# Patient Record
Sex: FEMALE | Race: WHITE
[De-identification: names, ages, dates, MRNs, and addresses within clinical notes are randomized per-mention and may not be internally consistent; named-entity substitution may affect disease eponyms.]

---

## 2020-10-01 ENCOUNTER — OFFICE VISIT (OUTPATIENT)
Age: 55
End: 2020-10-01

## 2021-04-08 ENCOUNTER — TELEPHONE ENCOUNTER (OUTPATIENT)
Dept: URBAN - METROPOLITAN AREA CLINIC 9 | Facility: CLINIC | Age: 56
End: 2021-04-08

## 2021-04-08 ENCOUNTER — OFFICE VISIT (OUTPATIENT)
Dept: URBAN - METROPOLITAN AREA CLINIC 7 | Facility: CLINIC | Age: 56
End: 2021-04-08

## 2021-04-15 ENCOUNTER — OFFICE VISIT (OUTPATIENT)
Dept: URBAN - METROPOLITAN AREA CLINIC 7 | Facility: CLINIC | Age: 56
End: 2021-04-15

## 2021-04-22 ENCOUNTER — OFFICE VISIT (OUTPATIENT)
Age: 56
End: 2021-04-22

## 2021-04-26 ENCOUNTER — OFFICE VISIT (OUTPATIENT)
Dept: URBAN - METROPOLITAN AREA SURGERY CENTER 5 | Facility: SURGERY CENTER | Age: 56
End: 2021-04-26

## 2021-05-14 ENCOUNTER — TELEPHONE ENCOUNTER (OUTPATIENT)
Dept: URBAN - METROPOLITAN AREA CLINIC 9 | Facility: CLINIC | Age: 56
End: 2021-05-14

## 2021-05-14 ENCOUNTER — OFFICE VISIT (OUTPATIENT)
Dept: URBAN - METROPOLITAN AREA SURGERY CENTER 5 | Facility: SURGERY CENTER | Age: 56
End: 2021-05-14

## 2021-05-27 ENCOUNTER — OFFICE VISIT (OUTPATIENT)
Dept: URBAN - METROPOLITAN AREA SURGERY CENTER 5 | Facility: SURGERY CENTER | Age: 56
End: 2021-05-27

## 2021-06-04 ENCOUNTER — OFFICE VISIT (OUTPATIENT)
Dept: URBAN - METROPOLITAN AREA SURGERY CENTER 5 | Facility: SURGERY CENTER | Age: 56
End: 2021-06-04

## 2021-06-07 ENCOUNTER — LAB OUTSIDE AN ENCOUNTER (OUTPATIENT)
Age: 56
End: 2021-06-07

## 2021-06-18 ENCOUNTER — LAB OUTSIDE AN ENCOUNTER (OUTPATIENT)
Age: 56
End: 2021-06-18

## 2021-06-18 LAB — FECAL OCCULT HGB ASSAY, QUAL, 1-3 SIMULTANEOU: POSITIVE

## 2021-06-21 ENCOUNTER — TELEPHONE ENCOUNTER (OUTPATIENT)
Dept: URBAN - METROPOLITAN AREA CLINIC 9 | Facility: CLINIC | Age: 56
End: 2021-06-21

## 2021-07-01 LAB
ABSOLUTE BASOPHILS: (no result)
ABSOLUTE EOSINOPHILS: (no result)
ABSOLUTE LYMPHOCYTES: (no result)
ABSOLUTE MONOCYTES: (no result)
ABSOLUTE NEUTROPHILS: (no result)
BASOPHILS: (no result)
EOSINOPHILS: (no result)
HEMATOCRIT: (no result)
HEMOGLOBIN: (no result)
LYMPHOCYTES: (no result)
MCH: (no result)
MCHC: (no result)
MCV: (no result)
MONOCYTES: (no result)
MPV: (no result)
NEUTROPHILS: (no result)
PLATELET COUNT: 351
RDW: (no result)
RED BLOOD CELL COUNT: (no result)
WHITE BLOOD CELL COUNT: 7.7

## 2021-07-09 ENCOUNTER — TELEPHONE ENCOUNTER (OUTPATIENT)
Dept: URBAN - METROPOLITAN AREA CLINIC 9 | Facility: CLINIC | Age: 56
End: 2021-07-09

## 2021-07-09 ENCOUNTER — LAB OUTSIDE AN ENCOUNTER (OUTPATIENT)
Age: 56
End: 2021-07-09

## 2021-07-22 LAB — Lab: (no result)

## 2021-07-26 LAB
ALPHA FETOPROTEIN, TUMOR MARKER: (no result)
ANA SCREEN, IFA: NEGATIVE
CERULOPLASMIN: (no result)
HEPATITIS A IGM: (no result)
HEPATITIS B CORE ANTIBODY (IGM): (no result)
HEPATITIS B SURFACE ANTIGEN (REFL): (no result)
HEPATITIS C ANTIBODY: (no result)
INDEX: 0.01
MITOCHONDRIAL AB SCREEN: NEGATIVE

## 2021-07-28 ENCOUNTER — TELEPHONE ENCOUNTER (OUTPATIENT)
Dept: URBAN - METROPOLITAN AREA CLINIC 9 | Facility: CLINIC | Age: 56
End: 2021-07-28

## 2021-07-28 ENCOUNTER — OFFICE VISIT (OUTPATIENT)
Dept: URBAN - METROPOLITAN AREA CLINIC 7 | Facility: CLINIC | Age: 56
End: 2021-07-28

## 2021-08-06 ENCOUNTER — TELEPHONE ENCOUNTER (OUTPATIENT)
Dept: URBAN - METROPOLITAN AREA CLINIC 9 | Facility: CLINIC | Age: 56
End: 2021-08-06

## 2021-08-13 ENCOUNTER — OFFICE VISIT (OUTPATIENT)
Dept: URBAN - METROPOLITAN AREA CLINIC 7 | Facility: CLINIC | Age: 56
End: 2021-08-13

## 2021-08-18 ENCOUNTER — OFFICE VISIT (OUTPATIENT)
Dept: URBAN - METROPOLITAN AREA CLINIC 7 | Facility: CLINIC | Age: 56
End: 2021-08-18

## 2021-08-19 ENCOUNTER — TELEPHONE ENCOUNTER (OUTPATIENT)
Dept: URBAN - METROPOLITAN AREA CLINIC 9 | Facility: CLINIC | Age: 56
End: 2021-08-19

## 2021-08-20 ENCOUNTER — OFFICE VISIT (OUTPATIENT)
Dept: URBAN - METROPOLITAN AREA CLINIC 7 | Facility: CLINIC | Age: 56
End: 2021-08-20

## 2021-08-31 ENCOUNTER — LAB OUTSIDE AN ENCOUNTER (OUTPATIENT)
Age: 56
End: 2021-08-31

## 2021-09-01 LAB
ABSOLUTE BASOPHILS: (no result)
ABSOLUTE EOSINOPHILS: (no result)
ABSOLUTE LYMPHOCYTES: (no result)
ABSOLUTE MONOCYTES: (no result)
ABSOLUTE NEUTROPHILS: (no result)
BASOPHILS: (no result)
EOSINOPHILS: (no result)
HEMATOCRIT: (no result)
HEMOGLOBIN: (no result)
LYMPHOCYTES: (no result)
MCH: (no result)
MCHC: (no result)
MCV: (no result)
MONOCYTES: (no result)
MPV: (no result)
NEUTROPHILS: (no result)
PLATELET COUNT: 327
RDW: (no result)
RED BLOOD CELL COUNT: (no result)
WHITE BLOOD CELL COUNT: 6.9

## 2022-07-30 ENCOUNTER — TELEPHONE ENCOUNTER (OUTPATIENT)
Age: 57
End: 2022-07-30

## 2022-07-30 RX ORDER — BACITRACIN 500 UNIT/G
2 (TWO) OINTMENT (GRAM) TOPICAL DAILY
Qty: 0 | Refills: 2 | OUTPATIENT
Start: 2021-07-28 | End: 2021-08-27

## 2022-07-31 ENCOUNTER — TELEPHONE ENCOUNTER (OUTPATIENT)
Age: 57
End: 2022-07-31

## 2022-07-31 RX ORDER — BACITRACIN 500 UNIT/G
2 (TWO) OINTMENT (GRAM) TOPICAL DAILY
Qty: 0 | Refills: 2 | Status: ACTIVE | COMMUNITY
Start: 2021-07-28

## 2022-08-31 ENCOUNTER — WEB ENCOUNTER (OUTPATIENT)
Dept: URBAN - METROPOLITAN AREA CLINIC 7 | Facility: CLINIC | Age: 57
End: 2022-08-31

## 2022-08-31 ENCOUNTER — LAB OUTSIDE AN ENCOUNTER (OUTPATIENT)
Dept: URBAN - METROPOLITAN AREA CLINIC 7 | Facility: CLINIC | Age: 57
End: 2022-08-31

## 2022-08-31 ENCOUNTER — OFFICE VISIT (OUTPATIENT)
Dept: URBAN - METROPOLITAN AREA CLINIC 7 | Facility: CLINIC | Age: 57
End: 2022-08-31
Payer: COMMERCIAL

## 2022-08-31 VITALS
DIASTOLIC BLOOD PRESSURE: 81 MMHG | BODY MASS INDEX: 38.8 KG/M2 | WEIGHT: 262 LBS | TEMPERATURE: 98 F | SYSTOLIC BLOOD PRESSURE: 123 MMHG | HEIGHT: 69 IN

## 2022-08-31 DIAGNOSIS — K76.0 NAFLD (NONALCOHOLIC FATTY LIVER DISEASE): ICD-10-CM

## 2022-08-31 DIAGNOSIS — D50.9 IRON DEFICIENCY ANEMIA: ICD-10-CM

## 2022-08-31 DIAGNOSIS — E66.9 OBESITY WITH BODY MASS INDEX 30 OR GREATER: ICD-10-CM

## 2022-08-31 DIAGNOSIS — R93.5 ABNORMAL CT OF THE ABDOMEN: ICD-10-CM

## 2022-08-31 DIAGNOSIS — D49.0 IPMN (INTRADUCTAL PAPILLARY MUCINOUS NEOPLASM): ICD-10-CM

## 2022-08-31 PROCEDURE — 99214 OFFICE O/P EST MOD 30 MIN: CPT | Performed by: INTERNAL MEDICINE

## 2022-08-31 RX ORDER — ESZOPICLONE 2 MG/1
1 TABLET IMMEDIATELY BEFORE BEDTIME TABLET, COATED ORAL
Status: ACTIVE | COMMUNITY

## 2022-08-31 RX ORDER — AMLODIPINE BESYLATE 5 MG/1
1 TABLET TABLET ORAL ONCE A DAY
Status: ACTIVE | COMMUNITY

## 2022-08-31 RX ORDER — BACITRACIN 500 UNIT/G
2 (TWO) OINTMENT (GRAM) TOPICAL DAILY
Qty: 0 | Refills: 2 | Status: ACTIVE | COMMUNITY
Start: 2021-07-28

## 2022-08-31 RX ORDER — RIVAROXABAN 20 MG/1
1 TABLET WITH FOOD TABLET, FILM COATED ORAL ONCE A DAY
Status: ACTIVE | COMMUNITY

## 2022-08-31 RX ORDER — OMEPRAZOLE 20 MG/1
1 CAPSULE 30 MINUTES BEFORE MORNING MEAL CAPSULE, DELAYED RELEASE ORAL ONCE A DAY
Status: ACTIVE | COMMUNITY

## 2022-08-31 RX ORDER — LOSARTAN POTASSIUM 100 MG/1
1 TABLET TABLET ORAL ONCE A DAY
Status: ACTIVE | COMMUNITY

## 2022-08-31 NOTE — HPI-TODAY'S VISIT:
56yo F with hx of NAFLD, mulitple liver hemangiomas, side branch IPMN seen on MRI 8/2021.  prior hx of gastric bypass, hx of prior live enzyme elevaltions.  Presents for follow up of panc cyst was told to repeat MRI.  She has not been able to lose weight as previously recommended she did have liver enzyme testing done 2 weeks ago by pcp was told enzymes normal. denies abdominal pain, abdominal distention, fever, leg swelling, vomiting of blood, black stool, rectal bleeding, confusion There has been no associated dysphagia, GERD, anorexia, weight loss, anemia, occult blood in stool, rectal bleeding, melena, nausea, vomiting, abdominal pain, postprandial fullness, early satiety, abdominal distention, bloating, change in bowel habits, change in stool caliber, diarrhea, constipation, anticoagulant/antiplatelet or NSAID's use.     No FMH of GI cancers including CRC.

## 2023-05-04 ENCOUNTER — OFFICE VISIT (OUTPATIENT)
Dept: URBAN - METROPOLITAN AREA CLINIC 7 | Facility: CLINIC | Age: 58
End: 2023-05-04
Payer: COMMERCIAL

## 2023-05-04 ENCOUNTER — LAB OUTSIDE AN ENCOUNTER (OUTPATIENT)
Dept: URBAN - METROPOLITAN AREA CLINIC 7 | Facility: CLINIC | Age: 58
End: 2023-05-04

## 2023-05-04 ENCOUNTER — DASHBOARD ENCOUNTERS (OUTPATIENT)
Age: 58
End: 2023-05-04

## 2023-05-04 VITALS
TEMPERATURE: 97.7 F | SYSTOLIC BLOOD PRESSURE: 132 MMHG | RESPIRATION RATE: 16 BRPM | DIASTOLIC BLOOD PRESSURE: 82 MMHG | WEIGHT: 260 LBS | HEIGHT: 69 IN | BODY MASS INDEX: 38.51 KG/M2

## 2023-05-04 DIAGNOSIS — D49.0 IPMN (INTRADUCTAL PAPILLARY MUCINOUS NEOPLASM): ICD-10-CM

## 2023-05-04 DIAGNOSIS — D18.03 LIVER HEMANGIOMA: ICD-10-CM

## 2023-05-04 DIAGNOSIS — K76.0 NAFLD (NONALCOHOLIC FATTY LIVER DISEASE): ICD-10-CM

## 2023-05-04 DIAGNOSIS — D50.9 IRON DEFICIENCY ANEMIA: ICD-10-CM

## 2023-05-04 DIAGNOSIS — R93.5 ABNORMAL CT OF THE ABDOMEN: ICD-10-CM

## 2023-05-04 PROBLEM — 93469006: Status: ACTIVE | Noted: 2023-05-04

## 2023-05-04 PROCEDURE — 99214 OFFICE O/P EST MOD 30 MIN: CPT | Performed by: INTERNAL MEDICINE

## 2023-05-04 RX ORDER — OMEPRAZOLE 20 MG/1
1 CAPSULE 30 MINUTES BEFORE MORNING MEAL CAPSULE, DELAYED RELEASE ORAL ONCE A DAY
Status: ACTIVE | COMMUNITY

## 2023-05-04 RX ORDER — AMLODIPINE BESYLATE 5 MG/1
1 TABLET TABLET ORAL ONCE A DAY
Status: ACTIVE | COMMUNITY

## 2023-05-04 RX ORDER — RIVAROXABAN 20 MG/1
1 TABLET WITH FOOD TABLET, FILM COATED ORAL ONCE A DAY
Status: ACTIVE | COMMUNITY

## 2023-05-04 RX ORDER — LOSARTAN POTASSIUM 100 MG/1
1 TABLET TABLET ORAL ONCE A DAY
Status: ACTIVE | COMMUNITY

## 2023-05-04 RX ORDER — ESZOPICLONE 2 MG/1
1 TABLET IMMEDIATELY BEFORE BEDTIME TABLET, COATED ORAL
Status: ACTIVE | COMMUNITY

## 2023-05-04 RX ORDER — BACITRACIN 500 UNIT/G
2 (TWO) OINTMENT (GRAM) TOPICAL DAILY
Qty: 0 | Refills: 2 | Status: ACTIVE | COMMUNITY
Start: 2021-07-28

## 2023-05-04 NOTE — HPI-TODAY'S VISIT:
58yo F with hx of NAFLD, mulitple liver hemangiomas, side branch IPMN seen on MRI 8/2021.  prior hx of gastric bypass, hx of prior live enzyme elevaltions.  Presents for follow up of panc cyst was told to repeat MRI.  She has not been able to lose weight as previously recommended she did have liver enzyme testing done 2 weeks ago by pcp was told enzymes normal. denies abdominal pain, abdominal distention, fever, leg swelling, vomiting of blood, black stool, rectal bleeding, confusion There has been no associated dysphagia, GERD, anorexia, weight loss, anemia, occult blood in stool, rectal bleeding, melena, nausea, vomiting, abdominal pain, postprandial fullness, early satiety, abdominal distention, bloating, change in bowel habits, change in stool caliber, diarrhea, constipation, anticoagulant/antiplatelet or NSAID's use.     No FMH of GI cancers including CRC.  Interval hx 5/4/23 here today for follow up after having ER visit for SOB and elevated d dimer concerning for dvt/pe however CTA was negative, it did incidentally show R liver mass.  However the patient has had known liver hemangiomas and IPMN which have been followed over a few years, her last triphasic MRI mass protocol of the liver was done 11/2023 which confirms a large hemangioma of R lobe of liver with smaller L sided hemangiomas (the CT and MRI measurements to vary however)  there was no arterial enhancement or wash out these features were considered to be c/w typical large hemangioma.  her fibroscan was S3 F0 from 11/2023 as well.  on 4/13/23 transaminases mildly elevated from ER labs.   plan continued abstinence from etoh, weigh tloss, repeat labs, will add AFP to th repeat labs.  She is up to date on CRC screening done by Dr. Jorgensen.  Would repeat MRI triphasic earlier than the 2 year plan repeat in 6 months to ensure stability.  if size is truly growing at this rate would refer to surgery even given its benign nature just based on growth rate and size

## 2023-07-01 LAB
AFP, SERUM: 5.1
AFP-L3%, SERUM: (no result)

## 2024-06-07 ENCOUNTER — TELEPHONE ENCOUNTER (OUTPATIENT)
Dept: URBAN - METROPOLITAN AREA CLINIC 7 | Facility: CLINIC | Age: 59
End: 2024-06-07

## 2024-06-11 ENCOUNTER — TELEPHONE ENCOUNTER (OUTPATIENT)
Dept: URBAN - METROPOLITAN AREA CLINIC 7 | Facility: CLINIC | Age: 59
End: 2024-06-11

## 2024-06-12 ENCOUNTER — LAB OUTSIDE AN ENCOUNTER (OUTPATIENT)
Dept: URBAN - METROPOLITAN AREA CLINIC 7 | Facility: CLINIC | Age: 59
End: 2024-06-12

## 2024-07-31 ENCOUNTER — TELEPHONE ENCOUNTER (OUTPATIENT)
Dept: URBAN - METROPOLITAN AREA CLINIC 9 | Facility: CLINIC | Age: 59
End: 2024-07-31

## 2024-08-02 ENCOUNTER — TELEPHONE ENCOUNTER (OUTPATIENT)
Dept: URBAN - METROPOLITAN AREA CLINIC 7 | Facility: CLINIC | Age: 59
End: 2024-08-02

## 2024-10-18 ENCOUNTER — LAB OUTSIDE AN ENCOUNTER (OUTPATIENT)
Dept: URBAN - METROPOLITAN AREA CLINIC 7 | Facility: CLINIC | Age: 59
End: 2024-10-18

## 2025-04-07 ENCOUNTER — OFFICE VISIT (OUTPATIENT)
Dept: URBAN - METROPOLITAN AREA CLINIC 9 | Facility: CLINIC | Age: 60
End: 2025-04-07
Payer: COMMERCIAL

## 2025-04-07 DIAGNOSIS — K76.0 NAFLD (NONALCOHOLIC FATTY LIVER DISEASE): ICD-10-CM

## 2025-04-07 DIAGNOSIS — D49.0 IPMN (INTRADUCTAL PAPILLARY MUCINOUS NEOPLASM): ICD-10-CM

## 2025-04-07 PROCEDURE — 99214 OFFICE O/P EST MOD 30 MIN: CPT | Performed by: INTERNAL MEDICINE

## 2025-04-07 RX ORDER — TIRZEPATIDE 2.5 MG/.5ML
INJECTION, SOLUTION SUBCUTANEOUS
Qty: 2 MILLILITER | Status: ACTIVE | COMMUNITY

## 2025-04-07 RX ORDER — LOSARTAN POTASSIUM 100 MG/1
1 TABLET TABLET ORAL ONCE A DAY
Status: ACTIVE | COMMUNITY

## 2025-04-07 RX ORDER — PHENTERMINE HYDROCHLORIDE 15 MG/1
TAKE 1 CAPSULE BY MOUTH EVERY DAY FOR 90 DAYS CAPSULE ORAL
Qty: 90 EACH | Refills: 1 | Status: ACTIVE | COMMUNITY

## 2025-04-07 RX ORDER — NALTREXONE HYDROCHLORIDE 50 MG/1
TAKE 1 TABLET BY MOUTH EVERY DAY TABLET, FILM COATED ORAL
Qty: 90 EACH | Refills: 1 | Status: ACTIVE | COMMUNITY

## 2025-04-07 RX ORDER — AMLODIPINE BESYLATE 5 MG/1
1 TABLET TABLET ORAL ONCE A DAY
Status: ACTIVE | COMMUNITY

## 2025-04-07 RX ORDER — BACITRACIN 500 UNIT/G
2 (TWO) OINTMENT (GRAM) TOPICAL DAILY
Qty: 0 | Refills: 2 | Status: DISCONTINUED | COMMUNITY
Start: 2021-07-28

## 2025-04-07 RX ORDER — RIVAROXABAN 20 MG/1
1 TABLET WITH FOOD TABLET, FILM COATED ORAL ONCE A DAY
Status: ACTIVE | COMMUNITY

## 2025-04-07 RX ORDER — ESZOPICLONE 2 MG/1
1 TABLET IMMEDIATELY BEFORE BEDTIME TABLET, COATED ORAL
Status: DISCONTINUED | COMMUNITY

## 2025-04-07 RX ORDER — OMEPRAZOLE 20 MG/1
1 CAPSULE 30 MINUTES BEFORE MORNING MEAL CAPSULE, DELAYED RELEASE ORAL ONCE A DAY
Status: DISCONTINUED | COMMUNITY

## 2025-04-07 NOTE — HPI-TODAY'S VISIT:
58yo F with hx of NAFLD, mulitple liver hemangiomas, side branch IPMN seen on MRI 8/2021.  prior hx of gastric bypass, hx of prior live enzyme elevaltions.  Presents for follow up of panc cyst was told to repeat MRI.  She has not been able to lose weight as previously recommended she did have liver enzyme testing done 2 weeks ago by pcp was told enzymes normal. denies abdominal pain, abdominal distention, fever, leg swelling, vomiting of blood, black stool, rectal bleeding, confusion There has been no associated dysphagia, GERD, anorexia, weight loss, anemia, occult blood in stool, rectal bleeding, melena, nausea, vomiting, abdominal pain, postprandial fullness, early satiety, abdominal distention, bloating, change in bowel habits, change in stool caliber, diarrhea, constipation, anticoagulant/antiplatelet or NSAID's use.     No FMH of GI cancers including CRC.  Interval hx 5/4/23 here today for follow up after having ER visit for SOB and elevated d dimer concerning for dvt/pe however CTA was negative, it did incidentally show R liver mass.  However the patient has had known liver hemangiomas and IPMN which have been followed over a few years, her last triphasic MRI mass protocol of the liver was done 11/2023 which confirms a large hemangioma of R lobe of liver with smaller L sided hemangiomas (the CT and MRI measurements to vary however)  there was no arterial enhancement or wash out these features were considered to be c/w typical large hemangioma.  her fibroscan was S3 F0 from 11/2023 as well.  on 4/13/23 transaminases mildly elevated from ER labs.   plan continued abstinence from etoh, weigh tloss, repeat labs, will add AFP to th repeat labs.  She is up to date on CRC screening done by Dr. Jorgensen.  Would repeat MRI triphasic earlier than the 2 year plan repeat in 6 months to ensure stability.  if size is truly growing at this rate would refer to surgery even given its benign nature just based on growth rate and size  IH 4/7/2025 59 year old female presents today for liver follow up  labs completed 4/2/2025 AST/ALT 37/45 ALP: 66 Bili 0.3  she had an MRI last year

## 2025-04-08 ENCOUNTER — OFFICE VISIT (OUTPATIENT)
Dept: URBAN - METROPOLITAN AREA CLINIC 7 | Facility: CLINIC | Age: 60
End: 2025-04-08

## 2025-07-01 ENCOUNTER — LAB OUTSIDE AN ENCOUNTER (OUTPATIENT)
Dept: URBAN - METROPOLITAN AREA CLINIC 7 | Facility: CLINIC | Age: 60
End: 2025-07-01

## 2025-07-18 ENCOUNTER — OFFICE VISIT (OUTPATIENT)
Dept: URBAN - METROPOLITAN AREA CLINIC 9 | Facility: CLINIC | Age: 60
End: 2025-07-18
Payer: COMMERCIAL

## 2025-07-18 DIAGNOSIS — K76.0 NAFLD (NONALCOHOLIC FATTY LIVER DISEASE): ICD-10-CM

## 2025-07-18 DIAGNOSIS — D49.0 IPMN (INTRADUCTAL PAPILLARY MUCINOUS NEOPLASM): ICD-10-CM

## 2025-07-18 DIAGNOSIS — D18.03 LIVER HEMANGIOMA: ICD-10-CM

## 2025-07-18 PROCEDURE — 99214 OFFICE O/P EST MOD 30 MIN: CPT | Performed by: PHYSICIAN ASSISTANT

## 2025-07-18 RX ORDER — AMLODIPINE BESYLATE 5 MG/1
1 TABLET TABLET ORAL ONCE A DAY
Status: DISCONTINUED | COMMUNITY

## 2025-07-18 RX ORDER — TIRZEPATIDE 2.5 MG/.5ML
INJECTION, SOLUTION SUBCUTANEOUS
Qty: 2 MILLILITER | Status: ACTIVE | COMMUNITY

## 2025-07-18 RX ORDER — NALTREXONE HYDROCHLORIDE 50 MG/1
TAKE 1 TABLET BY MOUTH EVERY DAY TABLET, FILM COATED ORAL
Qty: 90 EACH | Refills: 1 | Status: DISCONTINUED | COMMUNITY

## 2025-07-18 RX ORDER — PHENTERMINE HYDROCHLORIDE 15 MG/1
TAKE 1 CAPSULE BY MOUTH EVERY DAY FOR 90 DAYS CAPSULE ORAL
Qty: 90 EACH | Refills: 1 | Status: DISCONTINUED | COMMUNITY

## 2025-07-18 RX ORDER — RIVAROXABAN 20 MG/1
1 TABLET WITH FOOD TABLET, FILM COATED ORAL ONCE A DAY
Status: ACTIVE | COMMUNITY

## 2025-07-18 RX ORDER — LOSARTAN POTASSIUM 100 MG/1
1 TABLET TABLET ORAL ONCE A DAY
Status: DISCONTINUED | COMMUNITY

## 2025-07-18 NOTE — HPI-TODAY'S VISIT:
58yo F with hx of NAFLD, mulitple liver hemangiomas, side branch IPMN seen on MRI 8/2021.  prior hx of gastric bypass, hx of prior live enzyme elevaltions.  Presents for follow up of panc cyst was told to repeat MRI.  She has not been able to lose weight as previously recommended she did have liver enzyme testing done 2 weeks ago by pcp was told enzymes normal. denies abdominal pain, abdominal distention, fever, leg swelling, vomiting of blood, black stool, rectal bleeding, confusion There has been no associated dysphagia, GERD, anorexia, weight loss, anemia, occult blood in stool, rectal bleeding, melena, nausea, vomiting, abdominal pain, postprandial fullness, early satiety, abdominal distention, bloating, change in bowel habits, change in stool caliber, diarrhea, constipation, anticoagulant/antiplatelet or NSAID's use.    No FMH of GI cancers including CRC.  Interval hx 5/4/23 here today for follow up after having ER visit for SOB and elevated d dimer concerning for dvt/pe however CTA was negative, it did incidentally show R liver mass.  However the patient has had known liver hemangiomas and IPMN which have been followed over a few years, her last triphasic MRI mass protocol of the liver was done 11/2023 which confirms a large hemangioma of R lobe of liver with smaller L sided hemangiomas (the CT and MRI measurements to vary however)  there was no arterial enhancement or wash out these features were considered to be c/w typical large hemangioma.  her fibroscan was S3 F0 from 11/2023 as well.  on 4/13/23 transaminases mildly elevated from ER labs.   plan continued abstinence from etoh, weigh tloss, repeat labs, will add AFP to th repeat labs.  She is up to date on CRC screening done by Dr. Jorgensen.  Would repeat MRI triphasic earlier than the 2 year plan repeat in 6 months to ensure stability.  if size is truly growing at this rate would refer to surgery even given its benign nature just based on growth rate and size  IH 4/7/2025 59 year old female presents today for liver follow up  labs completed 4/2/2025 AST/ALT 37/45 ALP: 66 Bili 0.3  she had an MRI last year. 7/2025 MRI/MRCP marked hepatic steatosis, multiple hepatic hemangiomas, stable, slight distention of central intrahepatic biliary ducts, small IPMN, unchanged.  Dr. Balbuena advised 2-year repeat.  Interim visit 7/18/2025 60-year-old female, history of NAFLD, liver hemangiomas, hepatic steatosis, IPMN is, prior gastric bypass, who presents in follow-up after recent MRI/MRCP.  There was no sig change of an MRI/MRCP findings.  Repeat in 2 yrs advised. In reviewing prior labs - noted elevatd AFP that was advised to be repeated has not yet been repeated. Will order today.    No prior LFTS but last labs with AST/ALT 35/47. In terms of hepatic steatosis, recent AST/ALT 37/45, normal ALP and bilirubin. In a liver protocol per Dr. Christy. Just had a Fibroscan. Planned repeat for 2 yrs also.   On ZepBound - 30 lbs down.  No GI c/o.  RTC as needed and for MRI in 2 yrs.

## 2025-08-15 ENCOUNTER — LAB OUTSIDE AN ENCOUNTER (OUTPATIENT)
Dept: URBAN - METROPOLITAN AREA CLINIC 7 | Facility: CLINIC | Age: 60
End: 2025-08-15

## 2025-08-20 LAB
AFP, SERUM: 2.6
AFP-L3%, SERUM: (no result)